# Patient Record
Sex: MALE | Race: OTHER | HISPANIC OR LATINO | URBAN - METROPOLITAN AREA
[De-identification: names, ages, dates, MRNs, and addresses within clinical notes are randomized per-mention and may not be internally consistent; named-entity substitution may affect disease eponyms.]

---

## 2018-11-27 ENCOUNTER — ON CAMPUS - OUTPATIENT (OUTPATIENT)
Dept: URBAN - METROPOLITAN AREA HOSPITAL 35 | Facility: HOSPITAL | Age: 53
End: 2018-11-27
Payer: MEDICAID

## 2018-11-27 DIAGNOSIS — D12.5 BENIGN NEOPLASM OF SIGMOID COLON: ICD-10-CM

## 2018-11-27 DIAGNOSIS — R19.5 OTHER FECAL ABNORMALITIES: ICD-10-CM

## 2018-11-27 PROCEDURE — 45385 COLONOSCOPY W/LESION REMOVAL: CPT

## 2018-12-27 ENCOUNTER — ON CAMPUS - OUTPATIENT (OUTPATIENT)
Dept: URBAN - METROPOLITAN AREA HOSPITAL 35 | Facility: HOSPITAL | Age: 53
End: 2018-12-27
Payer: MEDICAID

## 2018-12-27 DIAGNOSIS — R19.5 OTHER FECAL ABNORMALITIES: ICD-10-CM

## 2018-12-27 DIAGNOSIS — K29.60 OTHER GASTRITIS WITHOUT BLEEDING: ICD-10-CM

## 2018-12-27 DIAGNOSIS — D62 ACUTE POSTHEMORRHAGIC ANEMIA: ICD-10-CM

## 2018-12-27 DIAGNOSIS — K31.7 POLYP OF STOMACH AND DUODENUM: ICD-10-CM

## 2018-12-27 PROCEDURE — 43239 EGD BIOPSY SINGLE/MULTIPLE: CPT

## 2019-02-19 ENCOUNTER — OFFICE (OUTPATIENT)
Dept: URBAN - METROPOLITAN AREA CLINIC 78 | Facility: CLINIC | Age: 54
End: 2019-02-19
Payer: MEDICAID

## 2019-02-19 VITALS
TEMPERATURE: 96.4 F | DIASTOLIC BLOOD PRESSURE: 91 MMHG | SYSTOLIC BLOOD PRESSURE: 135 MMHG | WEIGHT: 198 LBS | HEIGHT: 66 IN | HEART RATE: 74 BPM

## 2019-02-19 DIAGNOSIS — D64.9 ANEMIA, UNSPECIFIED: ICD-10-CM

## 2019-02-19 DIAGNOSIS — Z86.010 PERSONAL HISTORY OF COLONIC POLYPS: ICD-10-CM

## 2019-02-19 DIAGNOSIS — K70.9 ALCOHOLIC LIVER DISEASE, UNSPECIFIED: ICD-10-CM

## 2019-02-19 DIAGNOSIS — E11.9 TYPE 2 DIABETES MELLITUS WITHOUT COMPLICATIONS: ICD-10-CM

## 2019-02-19 PROCEDURE — 99214 OFFICE O/P EST MOD 30 MIN: CPT

## 2019-02-19 NOTE — SERVICEHPINOTES
54 yo male presents for f/u anemia and alcoholic liver disease. He is here for f/u after having an EGD and colonoscopy in late 2018. His EGD showed gastritis and benign polyp with no further f/u advised. His colonoscopy showed two adenomatous polyps, one was tubulovillous. A 3-year recall was advised. The labs we ordered last summer were not received, though he says he got them done. No current GI complaints today.He was seen here in August 2018, at which time he had reported h/o admission to [facility] in October 2017 after blacking out - was apparently attributed to alcoholism. He believes he may also have cirrhosis of the liver. He reports drinking heavily since his 20s. Stopped completely in late 2017. He denies h/o drug use. Patient is feeling pretty well overall though is using a cane and reports possible hip replacement in future. 5/22/18 Hgb 9.7, MCV 78, plt 103, creat 0.92, alb 4.2, bili 1.5, AST/ALT 22/14, INR 1.3, HIV neg, Hep C ab neg, HBsAg neg, AFP 1.9, HgbA1C 5.4

## 2019-03-04 ENCOUNTER — OFFICE (OUTPATIENT)
Dept: URBAN - METROPOLITAN AREA CLINIC 101 | Facility: CLINIC | Age: 54
End: 2019-03-04
Payer: MEDICAID

## 2019-03-04 DIAGNOSIS — K70.9 ALCOHOLIC LIVER DISEASE, UNSPECIFIED: ICD-10-CM

## 2019-03-04 PROCEDURE — 91200 LIVER ELASTOGRAPHY: CPT

## 2019-03-29 ENCOUNTER — OFFICE (OUTPATIENT)
Dept: URBAN - METROPOLITAN AREA CLINIC 78 | Facility: CLINIC | Age: 54
End: 2019-03-29
Payer: MEDICAID

## 2019-03-29 VITALS
WEIGHT: 198 LBS | SYSTOLIC BLOOD PRESSURE: 140 MMHG | HEART RATE: 84 BPM | DIASTOLIC BLOOD PRESSURE: 87 MMHG | TEMPERATURE: 97.5 F | HEIGHT: 66 IN

## 2019-03-29 DIAGNOSIS — E11.9 TYPE 2 DIABETES MELLITUS WITHOUT COMPLICATIONS: ICD-10-CM

## 2019-03-29 DIAGNOSIS — Z86.010 PERSONAL HISTORY OF COLONIC POLYPS: ICD-10-CM

## 2019-03-29 DIAGNOSIS — K74.60 UNSPECIFIED CIRRHOSIS OF LIVER: ICD-10-CM

## 2019-03-29 PROCEDURE — 99214 OFFICE O/P EST MOD 30 MIN: CPT

## 2019-03-29 RX ORDER — VITAMIN B COMPLEX
TABLET ORAL
Qty: 30 | Refills: 11 | Status: ACTIVE
Start: 2019-03-29

## 2019-03-29 NOTE — SERVICEHPINOTES
54 yo male presents for f/u liver disease. His Fibroscan showed severe steatosis and evidence of cirrhosis (S3, F4). He had to reschedule his U/S so hasn't gotten that done yet. Labs show mild liver dysfunction with MELD of 12. Patient has h/o alcoholism but has been sober since late 2017. He also has diabetes. He denies any GI complaints today. He is using a cane - states that he needs to have his right hip replaced.Patient had an EGD and colonoscopy here in December 2018 which showed normal esophagus, gastritis and benign polyp. His colonoscopy showed two adenomatous polyps, one was tubulovillous. A 3-year recall was advised. 2/26/19 Hgb 13.2, plt 94, Na 140, creat 1.02, alb 4.9, bili 1.7, alk phos 80, AST/ALT 20/14, folate 19.6, Vit B12 370, iron sat 40, ferritin 41, INR 1.3 MELD 12BR5/22/18 Hgb 9.7, MCV 78, plt 103, creat 0.92, alb 4.2, bili 1.5, AST/ALT 22/14, INR 1.3, HIV neg, Hep C ab neg, HBsAg neg, AFP 1.9, HgbA1C 5.4